# Patient Record
Sex: FEMALE | Race: BLACK OR AFRICAN AMERICAN | Employment: FULL TIME | URBAN - METROPOLITAN AREA
[De-identification: names, ages, dates, MRNs, and addresses within clinical notes are randomized per-mention and may not be internally consistent; named-entity substitution may affect disease eponyms.]

---

## 2017-11-22 ENCOUNTER — APPOINTMENT (OUTPATIENT)
Dept: GENERAL RADIOLOGY | Age: 42
End: 2017-11-22
Attending: NURSE PRACTITIONER

## 2017-11-22 ENCOUNTER — HOSPITAL ENCOUNTER (EMERGENCY)
Age: 42
Discharge: HOME | End: 2017-11-22
Attending: EMERGENCY MEDICINE

## 2017-11-22 VITALS
SYSTOLIC BLOOD PRESSURE: 162 MMHG | OXYGEN SATURATION: 100 % | HEART RATE: 80 BPM | RESPIRATION RATE: 20 BRPM | BODY MASS INDEX: 43.43 KG/M2 | TEMPERATURE: 98 F | HEIGHT: 61 IN | WEIGHT: 230 LBS | DIASTOLIC BLOOD PRESSURE: 111 MMHG

## 2017-11-22 DIAGNOSIS — S39.012A LOW BACK STRAIN, INITIAL ENCOUNTER: Primary | ICD-10-CM

## 2017-11-22 DIAGNOSIS — J20.9 ACUTE BRONCHITIS, UNSPECIFIED ORGANISM: ICD-10-CM

## 2017-11-22 PROCEDURE — 94640 AIRWAY INHALATION TREATMENT: CPT

## 2017-11-22 PROCEDURE — 99284 EMERGENCY DEPT VISIT MOD MDM: CPT

## 2017-11-22 PROCEDURE — 71020 XR CHEST PA + LAT CHEST (CPT=71020): CPT | Performed by: NURSE PRACTITIONER

## 2017-11-22 RX ORDER — KETOROLAC TROMETHAMINE 30 MG/ML
60 INJECTION, SOLUTION INTRAMUSCULAR; INTRAVENOUS ONCE
Status: DISCONTINUED | OUTPATIENT
Start: 2017-11-22 | End: 2017-11-22

## 2017-11-22 RX ORDER — DIAZEPAM 5 MG/1
5 TABLET ORAL 3 TIMES DAILY PRN
Qty: 20 TABLET | Refills: 0 | Status: SHIPPED | OUTPATIENT
Start: 2017-11-22 | End: 2017-12-01

## 2017-11-22 RX ORDER — TRAMADOL HYDROCHLORIDE 50 MG/1
50 TABLET ORAL EVERY 6 HOURS PRN
Qty: 10 TABLET | Refills: 0 | Status: SHIPPED | OUTPATIENT
Start: 2017-11-22 | End: 2017-12-01

## 2017-11-22 RX ORDER — IPRATROPIUM BROMIDE AND ALBUTEROL SULFATE 2.5; .5 MG/3ML; MG/3ML
3 SOLUTION RESPIRATORY (INHALATION) ONCE
Status: COMPLETED | OUTPATIENT
Start: 2017-11-22 | End: 2017-11-22

## 2017-11-22 RX ORDER — PREDNISONE 20 MG/1
40 TABLET ORAL DAILY
Qty: 10 TABLET | Refills: 0 | Status: SHIPPED | OUTPATIENT
Start: 2017-11-22 | End: 2017-12-01

## 2017-11-22 RX ORDER — TRAMADOL HYDROCHLORIDE 50 MG/1
50 TABLET ORAL ONCE
Status: DISCONTINUED | OUTPATIENT
Start: 2017-11-22 | End: 2017-11-22

## 2017-11-22 RX ORDER — CYCLOBENZAPRINE HCL 10 MG
10 TABLET ORAL ONCE
Status: DISCONTINUED | OUTPATIENT
Start: 2017-11-22 | End: 2017-11-22

## 2017-11-23 NOTE — ED NOTES
PT  Refused offer of PO ultram and refused a prescription stating that she has not tolerated  It in the past. PT came to  The nurses station after discharge and stated that \"I was mistaken. Tramadol doesn't make me sick. I can take that one.  I'll taptionk

## 2017-11-23 NOTE — ED PROVIDER NOTES
I reviewed that chart and discussed the case. I have examined the patient and noted forced expiratory wheeze noted. cv normal. No vertebral tenderness. I agree with the following clinical impression(s):  No diagnosis found. .    I agree with the plan

## 2018-07-04 NOTE — ED PROVIDER NOTES
Patient Seen in: THE [facility] Emergency Department In [place]    History   Patient presents with:  Cough/URI    Stated Complaint: cough uri     42-year-old female presents today with complaints of URI symptoms and frequent cough.   Patient denies sore throat negative except as noted above. Physical Exam   ED Triage Vitals [11/22/17 1902]  BP: (!) 162/111  Pulse: 80  Resp: 20  Temp: 98.2 °F (36.8 °C)  Temp src: Temporal  SpO2: 100 %  O2 Device: None (Room air)    Current:BP (!) 162/111   Pulse 80   Temp 98. 2 radiographs were obtained. PATIENT STATED HISTORY: (As transcribed by Technologist)  Patient presents with midsternal chest pain and congestion since Sunday. Patient is feeling short of breath and states she has a fever.  Patient states she quit smoking 6 Potential duplicate medications found. Please discuss with provider. Admission

## 2020-12-23 PROBLEM — E04.2 MULTINODULAR THYROID: Status: ACTIVE | Noted: 2020-12-23

## 2020-12-23 PROBLEM — I10 ESSENTIAL HYPERTENSION: Status: ACTIVE | Noted: 2020-12-23

## 2020-12-23 PROBLEM — E05.90 HYPERTHYROIDISM: Status: ACTIVE | Noted: 2020-12-23

## 2021-01-10 PROBLEM — E88.81 INSULIN RESISTANCE: Status: ACTIVE | Noted: 2021-01-10

## (undated) NOTE — ED AVS SNAPSHOT
Wayneberenice William   MRN: [id number]    Department:  [facility] Emergency Department in [place]   Date of Visit:  11/22/2017           Disclosure     Insurance plans vary and the physician(s) referred by the ER may not be covered by your plan.  Please cont tell this physician (or your personal doctor if your instructions are to return to your personal doctor) about any new or lasting problems. The primary care or specialist physician will see patients referred from the [facility] Emergency Department.  Lc Dela Cruz